# Patient Record
Sex: MALE | Race: WHITE | Employment: FULL TIME | URBAN - METROPOLITAN AREA
[De-identification: names, ages, dates, MRNs, and addresses within clinical notes are randomized per-mention and may not be internally consistent; named-entity substitution may affect disease eponyms.]

---

## 2023-01-02 ENCOUNTER — HOSPITAL ENCOUNTER (EMERGENCY)
Facility: CLINIC | Age: 68
Discharge: LEFT BEFORE DISCHARGE | End: 2023-01-02
Attending: EMERGENCY MEDICINE
Payer: COMMERCIAL

## 2023-01-02 VITALS
HEART RATE: 81 BPM | HEIGHT: 69 IN | RESPIRATION RATE: 18 BRPM | SYSTOLIC BLOOD PRESSURE: 125 MMHG | OXYGEN SATURATION: 97 % | BODY MASS INDEX: 26.66 KG/M2 | DIASTOLIC BLOOD PRESSURE: 76 MMHG | TEMPERATURE: 97.7 F | WEIGHT: 180 LBS

## 2023-01-02 DIAGNOSIS — H53.40 UNSPECIFIED VISUAL FIELD DEFECTS: Primary | ICD-10-CM

## 2023-01-02 PROCEDURE — 99281 EMR DPT VST MAYX REQ PHY/QHP: CPT

## 2023-01-02 RX ORDER — ASPIRIN 81 MG/1
81 TABLET, CHEWABLE ORAL DAILY
COMMUNITY

## 2023-01-02 ASSESSMENT — PAIN - FUNCTIONAL ASSESSMENT: PAIN_FUNCTIONAL_ASSESSMENT: NONE - DENIES PAIN

## 2023-01-02 NOTE — ED NOTES
Pt presents to ED c/o right eye problem. Pt states that when he woke up this morning it seemed as though he had something in his eye. Pt states that his vision in the right eye has become progressively worse/blurry throughout the day. Pt denies any drainage or pain in the eye. Sclera appears slightly red but no obvious foreign body noted. Pt arrives A/Ox4, PWD, PMS intact. Pt resting on stretcher with call light in reach.      Obdulia Pop, RN  01/02/23 [id number]

## 2023-01-03 NOTE — ED PROVIDER NOTES
[address] ED  EMERGENCY DEPARTMENT ENCOUNTER      Pt Name: Donice Dance  MRN: [id number]  [place] 1955  Date of evaluation: 1/2/2023  Provider: Jarret Guillen MD    CHIEF COMPLAINT     Chief Complaint   Patient presents with    Eye Problem         HISTORY OF PRESENT ILLNESS   (Location/Symptom, Timing/Onset, Context/Setting,Quality, Duration, Modifying Factors, Severity)  Note limiting factors. Donice Dance is a 74 y.o. male who presents to the emergency department stating since he got up this morning he has been seeing flashes of lines in the visual field of his right eye along with blurred vision and some green color in the peripheral field of vision. He denies any problem with the vision in his left eye. Patient is diabetic but states he has been off of all his hypoglycemics and that his last hemoglobin A1c was 6.1. He has had cataract removal and thinks he may have had retinal surgery in the past also. He states that he took a nap during the day long and this evening, when he went to his ophthalmologist's office it was closed. The history is provided by the patient. Nursing Notes werereviewed. REVIEW OF SYSTEMS    (2-9 systems for level 4, 10 or more for level 5)     Review of Systems   Constitutional:  Negative for fever. Respiratory:  Negative for shortness of breath. Cardiovascular:  Negative for chest pain. Gastrointestinal:  Negative for abdominal pain. All other systems reviewed and are negative. Except as noted above the remainder of the review of systems was reviewed and negative. PAST MEDICAL HISTORY   History reviewed. No pertinent past medical history. SURGICALHISTORY     History reviewed. No pertinent surgical history.       CURRENT MEDICATIONS       Discharge Medication List as of 1/2/2023  7:15 PM        CONTINUE these medications which have NOT CHANGED    Details   aspirin 81 MG chewable tablet Take 81 mg by mouth dailyHistorical Med ALLERGIES     Patient has no known allergies. FAMILY HISTORY     History reviewed. No pertinent family history. SOCIAL HISTORY       Social History     Socioeconomic History    Marital status: Single     Spouse name: None    Number of children: None    Years of education: None    Highest education level: None       SCREENINGS    [place]ons Coma Scale  Eye Opening: Spontaneous  Best Verbal Response: Oriented  Best Motor Response: Obeys commands  Lyons Coma Scale Score: 15        PHYSICAL EXAM    (up to 7 for level 4, 8 or more for level 5)     ED Triage Vitals [01/02/23 1801]   BP Temp Temp Source Heart Rate Resp SpO2 Height Weight   125/76 97.7 °F (36.5 °C) Oral 81 18 97 % 5' 9\" (1.753 m) 180 lb (81.6 kg)       Physical Exam  Vitals reviewed. Constitutional:       General: He is not in acute distress. Appearance: He is not ill-appearing. HENT:      Head: Normocephalic. Right Ear: External ear normal.      Left Ear: External ear normal.      Nose: Nose normal.      Mouth/Throat:      Mouth: Mucous membranes are moist.   Eyes:      General: No scleral icterus. Extraocular Movements: Extraocular movements intact. Conjunctiva/sclera: Conjunctivae normal.      Pupils: Pupils are equal, round, and reactive to light. Neurological:      Mental Status: He is alert. DIAGNOSTIC RESULTS     EKG: All EKG's are interpreted by the Emergency Department Physician who either signs orCo-signs this chart in the absence of a cardiologist.    RADIOLOGY:     Interpretation per the Radiologist below, ifavailable at the time of this note:    No orders to display         ED BEDSIDE ULTRASOUND:   Performed by ED Physician - none    LABS:  Labs Reviewed - No data to display    All other labs were within normal range ornot returned as of this dictation.     EMERGENCY DEPARTMENT COURSE and DIFFERENTIAL DIAGNOSIS/MDM:   Vitals:    Vitals:    01/02/23 1801   BP: 125/76   Pulse: 81   Resp: 18   Temp: 97.7 °F (36.5 °C)   TempSrc: Oral   SpO2: 97%   Weight: 81.6 kg (180 lb)   Height: 5' 9\" (1.753 m)            Based on the patient's complaint he likely has retinal detachment and this was discussed with him. Visual acuity is ordered but before nurse could perform this screening test the patient had left the ED. MDM    CONSULTS:  None    PROCEDURES:  Unlessotherwise noted below, none     Procedures    FINAL IMPRESSION      1. Unspecified visual field defects          DISPOSITION/PLAN   DISPOSITION Eloped - Left Before Treatment Complete 01/02/2023 06:41:03 PM      PATIENT REFERRED TO:  No follow-up provider specified. DISCHARGE MEDICATIONS:         Problem List:  There is no problem list on file for this patient. Summation      Patient Course: Patient eloped    ED Medicationsadministered this visit:  Medications - No data to display    New Prescriptions from this visit:    Discharge Medication List as of 1/2/2023  7:15 PM          Follow-up:  No follow-up provider specified. Final Impression:   1.  Unspecified visual field defects               (Please note that portions of this note were completed with a voice recognitionprogram.  Efforts were made to edit the dictations but occasionally words are mis-transcribed.)    Devin Williamson MD (electronically signed)  Attending Emergency Physician            Devin Williamson MD  01/07/23 7415

## 2023-01-07 ASSESSMENT — ENCOUNTER SYMPTOMS
ABDOMINAL PAIN: 0
SHORTNESS OF BREATH: 0